# Patient Record
Sex: MALE | Race: WHITE
[De-identification: names, ages, dates, MRNs, and addresses within clinical notes are randomized per-mention and may not be internally consistent; named-entity substitution may affect disease eponyms.]

---

## 2018-12-10 ENCOUNTER — HOSPITAL ENCOUNTER (OUTPATIENT)
Dept: HOSPITAL 89 - US | Age: 23
End: 2018-12-10
Attending: NURSE PRACTITIONER
Payer: COMMERCIAL

## 2018-12-10 DIAGNOSIS — R10.31: Primary | ICD-10-CM

## 2018-12-10 LAB — PLATELET COUNT, AUTOMATED: 232 K/UL (ref 150–450)

## 2018-12-10 PROCEDURE — 82374 ASSAY BLOOD CARBON DIOXIDE: CPT

## 2018-12-10 PROCEDURE — 84520 ASSAY OF UREA NITROGEN: CPT

## 2018-12-10 PROCEDURE — 82565 ASSAY OF CREATININE: CPT

## 2018-12-10 PROCEDURE — 82040 ASSAY OF SERUM ALBUMIN: CPT

## 2018-12-10 PROCEDURE — 84075 ASSAY ALKALINE PHOSPHATASE: CPT

## 2018-12-10 PROCEDURE — 82247 BILIRUBIN TOTAL: CPT

## 2018-12-10 PROCEDURE — 84450 TRANSFERASE (AST) (SGOT): CPT

## 2018-12-10 PROCEDURE — 84155 ASSAY OF PROTEIN SERUM: CPT

## 2018-12-10 PROCEDURE — 82435 ASSAY OF BLOOD CHLORIDE: CPT

## 2018-12-10 PROCEDURE — 85025 COMPLETE CBC W/AUTO DIFF WBC: CPT

## 2018-12-10 PROCEDURE — 36415 COLL VENOUS BLD VENIPUNCTURE: CPT

## 2018-12-10 PROCEDURE — 84295 ASSAY OF SERUM SODIUM: CPT

## 2018-12-10 PROCEDURE — 82947 ASSAY GLUCOSE BLOOD QUANT: CPT

## 2018-12-10 PROCEDURE — 76705 ECHO EXAM OF ABDOMEN: CPT

## 2018-12-10 PROCEDURE — 84132 ASSAY OF SERUM POTASSIUM: CPT

## 2018-12-10 PROCEDURE — 84460 ALANINE AMINO (ALT) (SGPT): CPT

## 2018-12-10 PROCEDURE — 82310 ASSAY OF CALCIUM: CPT

## 2018-12-10 PROCEDURE — 74177 CT ABD & PELVIS W/CONTRAST: CPT

## 2018-12-10 NOTE — RADIOLOGY IMAGING REPORT
FACILITY: [facility] [place] 

 

PATIENT NAME: Alexi Le

: 1995

MR: [id number]

V: [id number]

EXAM DATE: 

ORDERING PHYSICIAN: TRINI QUACH

TECHNOLOGIST: 

 

Location: [facility]

Patient: Alexi Le

: 1995

MRN: [id number]

Visit/Account:[id number]

Date of Sevice: 12/10/2018

 

ACCESSION #: [id number]

 

CT abdomen and pelvis with IV contrast

 

Indication: Right lower quadrant pain.

 

Comparison:   None available. .

 

Technique:   Axial CT images were obtained through the abdomen and pelvis during injection of nonioni
c iodinated intravenous contrast. Reformatted coronal and sagittal images were also obtained.

One of the following dose optimization techniques was utilized in the performance of this exam: Autom
ated exposure control; adjustment of the mA and/or kV according to the patient's size; or use of an i
terative  reconstruction technique.  Specific details can be referenced in the facility's radiology C
T exam operational policy.

Contrast:   75 ml of Isovue-370  IV contrast.

 

Findings:

Lower lung fields: Limited views lower lung field are unremarkable.

 

Liver: No focal parenchymal abnormality of the liver.

Biliary: Gallbladder appears unremarkable as well as the intra and extra hepatic biliary system.

Pancreas: Normal appearance.

Spleen: Normal appearance.

Adrenal glands: Unremarkable.

Kidneys / retroperitoneum: No evidence of nephrolithiasis or hydronephrosis. No focal abnormality. Th
ere is mild prominent narrowing of the left renal vein as it crosses between the aorta and SMA withou
t focal abnormality.

 

 

Bowel / peritoneum / mesenteries: Visualized gastrointestinal tract, including the appendix, within n
ormal limits for the stomach is unremarkable.

No free air, free fluid, fluid collections or areas of inflammation.

 

Lymph node assessment: No pathologic adenopathy identified.

 

Pelvic  structures: Appear unremarkable.

 

 

Vessels: No significant atherosclerotic calcifications seen throughout a nonaneurysmal abdominal aort
a and branches.

 

Musculoskeletal / Body wall: No acute or aggressive osseous abnormality.

 

 

 

IMPRESSION:

1. No acute intra-abdominal abnormality. The appendix is normal.

2. 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  Correlate clinicall
y for possible history of hematuria and left flank pain (Nutcracker Syndrome).

 

 

Report Dictated By: Shahid Larson at 12/10/2018 7:00 PM

 

Report E-Signed By: Shahid Larson  at 12/10/2018 7:05 PM

 

WSN:[id number]

## 2018-12-10 NOTE — RADIOLOGY IMAGING REPORT
FACILITY: [facility] [place] 

 

PATIENT NAME: Alexi Le

: 1995

MR: [id number]

V: [id number]

EXAM DATE: 

ORDERING PHYSICIAN: TRINI QUACH

TECHNOLOGIST: 

 

Location: [facility] [place]

Patient: Alexi Le

: 1995

MRN: [id number]

Visit/Account:[id number]

Date of Sevice: 12/10/2018

 

ACCESSION #: [id number]

 

PELVIC

 

INDICATION: Right lower quadrant pain.

 

COMPARISON: None available

 

FINDINGS:   Transabdominal sonographic images of the right lower quadrant. The appendix is not visual
ized. No mass or enlarged lymph node. No fluid or fluid collection.

 

IMPRESSION: The appendix is not visualized. No focal abnormality.

 

Report Dictated By: Shahid Larson at 12/10/2018 6:59 PM

 

Report E-Signed By: Shahid Larson  at 12/10/2018 7:00 PM

 

WSN:[id number]